# Patient Record
Sex: FEMALE | URBAN - METROPOLITAN AREA
[De-identification: names, ages, dates, MRNs, and addresses within clinical notes are randomized per-mention and may not be internally consistent; named-entity substitution may affect disease eponyms.]

---

## 2018-09-11 NOTE — PATIENT DISCUSSION
Monitor for changes. Advised patient of condition and likely causing some change in vision. Updated GLRx given today. Pt to call our office with decreased vision or increased symptoms.

## 2020-09-16 ENCOUNTER — IMPORTED ENCOUNTER (OUTPATIENT)
Dept: URBAN - METROPOLITAN AREA CLINIC 31 | Facility: CLINIC | Age: 55
End: 2020-09-16

## 2020-09-16 PROBLEM — H43.813: Noted: 2020-09-16

## 2020-09-16 PROCEDURE — 92250 FUNDUS PHOTOGRAPHY W/I&R: CPT

## 2020-09-16 PROCEDURE — 92015 DETERMINE REFRACTIVE STATE: CPT

## 2020-09-16 PROCEDURE — 92004 COMPRE OPH EXAM NEW PT 1/>: CPT

## 2020-09-16 NOTE — PATIENT DISCUSSION
1.  PVD OU:  Patient was cautioned to call our office immediately if they experience a substantial change in their symptoms such as an increase in floaters persistent flashes loss of visual field (may appear as a shadow or a curtain) or decrease in visual acuity as these may indicate a retinal tear or detachment. 2.  [place]--  using Rx readers that are very strong--Got Prog from [place] last yr and cannot use. Has to lift head alot. disc Prog with pt and she will have hers checked with Era Rodriguez states hers are set very low and Bf cut off.   wants stronger readers --expl will make mid harder. Wants new rpog for work.  3.  RTN 1 yr CE    Medical

## 2020-10-26 ENCOUNTER — APPOINTMENT (RX ONLY)
Dept: URBAN - METROPOLITAN AREA CLINIC 125 | Facility: CLINIC | Age: 55
Setting detail: DERMATOLOGY
End: 2020-10-26

## 2020-10-26 DIAGNOSIS — L40.0 PSORIASIS VULGARIS: ICD-10-CM

## 2020-10-26 DIAGNOSIS — Z71.89 OTHER SPECIFIED COUNSELING: ICD-10-CM

## 2020-10-26 DIAGNOSIS — Z85.828 PERSONAL HISTORY OF OTHER MALIGNANT NEOPLASM OF SKIN: ICD-10-CM

## 2020-10-26 DIAGNOSIS — D69.2 OTHER NONTHROMBOCYTOPENIC PURPURA: ICD-10-CM

## 2020-10-26 DIAGNOSIS — L82.1 OTHER SEBORRHEIC KERATOSIS: ICD-10-CM

## 2020-10-26 PROCEDURE — ? COUNSELING

## 2020-10-26 PROCEDURE — 99202 OFFICE O/P NEW SF 15 MIN: CPT

## 2020-10-26 PROCEDURE — ? PRESCRIPTION

## 2020-10-26 RX ORDER — FLUOCINONIDE 0.5 MG/ML
1 SOLUTION TOPICAL BID
Qty: 1 | Refills: 1 | Status: ERX | COMMUNITY
Start: 2020-10-26

## 2020-10-26 RX ADMIN — FLUOCINONIDE 1: 0.5 SOLUTION TOPICAL at 00:00

## 2020-10-26 ASSESSMENT — LOCATION ZONE DERM
LOCATION ZONE: NECK
LOCATION ZONE: NECK
LOCATION ZONE: TRUNK
LOCATION ZONE: ARM

## 2020-10-26 ASSESSMENT — LOCATION DETAILED DESCRIPTION DERM
LOCATION DETAILED: MID POSTERIOR NECK
LOCATION DETAILED: LEFT DISTAL DORSAL FOREARM
LOCATION DETAILED: LEFT PROXIMAL DORSAL FOREARM
LOCATION DETAILED: MID POSTERIOR NECK
LOCATION DETAILED: RIGHT LATERAL SUPERIOR CHEST

## 2020-10-26 ASSESSMENT — LOCATION SIMPLE DESCRIPTION DERM
LOCATION SIMPLE: LEFT FOREARM
LOCATION SIMPLE: CHEST
LOCATION SIMPLE: POSTERIOR NECK
LOCATION SIMPLE: POSTERIOR NECK

## 2020-10-26 ASSESSMENT — BSA PSORIASIS: % BODY COVERED IN PSORIASIS: 1

## 2020-10-26 NOTE — HPI: BODY LOCATION - SCALP
How Severe Is Your Condition?: mild
Additional History: Patient washes her hair with a regular shampoo and has in the past tried Neutrogena T-Gel. Patient states that her son has psoriasis on his scalp.

## 2020-11-10 ENCOUNTER — APPOINTMENT (RX ONLY)
Dept: URBAN - METROPOLITAN AREA CLINIC 125 | Facility: CLINIC | Age: 55
Setting detail: DERMATOLOGY
End: 2020-11-10

## 2020-11-10 DIAGNOSIS — Z71.89 OTHER SPECIFIED COUNSELING: ICD-10-CM

## 2020-11-10 DIAGNOSIS — L81.4 OTHER MELANIN HYPERPIGMENTATION: ICD-10-CM

## 2020-11-10 DIAGNOSIS — L57.0 ACTINIC KERATOSIS: ICD-10-CM

## 2020-11-10 DIAGNOSIS — D485 NEOPLASM OF UNCERTAIN BEHAVIOR OF SKIN: ICD-10-CM

## 2020-11-10 DIAGNOSIS — L82.1 OTHER SEBORRHEIC KERATOSIS: ICD-10-CM

## 2020-11-10 DIAGNOSIS — Z85.828 PERSONAL HISTORY OF OTHER MALIGNANT NEOPLASM OF SKIN: ICD-10-CM

## 2020-11-10 DIAGNOSIS — D18.0 HEMANGIOMA: ICD-10-CM

## 2020-11-10 PROBLEM — D18.01 HEMANGIOMA OF SKIN AND SUBCUTANEOUS TISSUE: Status: ACTIVE | Noted: 2020-11-10

## 2020-11-10 PROBLEM — D23.71 OTHER BENIGN NEOPLASM OF SKIN OF RIGHT LOWER LIMB, INCLUDING HIP: Status: ACTIVE | Noted: 2020-11-10

## 2020-11-10 PROBLEM — D48.5 NEOPLASM OF UNCERTAIN BEHAVIOR OF SKIN: Status: ACTIVE | Noted: 2020-11-10

## 2020-11-10 PROCEDURE — ? BIOPSY BY SHAVE METHOD

## 2020-11-10 PROCEDURE — 11102 TANGNTL BX SKIN SINGLE LES: CPT

## 2020-11-10 PROCEDURE — 17003 DESTRUCT PREMALG LES 2-14: CPT

## 2020-11-10 PROCEDURE — 99214 OFFICE O/P EST MOD 30 MIN: CPT | Mod: 25

## 2020-11-10 PROCEDURE — ? LIQUID NITROGEN

## 2020-11-10 PROCEDURE — 17000 DESTRUCT PREMALG LESION: CPT | Mod: 59

## 2020-11-10 PROCEDURE — ? COUNSELING

## 2020-11-10 ASSESSMENT — LOCATION DETAILED DESCRIPTION DERM
LOCATION DETAILED: LEFT SUPERIOR MEDIAL MIDBACK
LOCATION DETAILED: UPPER STERNUM
LOCATION DETAILED: NASAL DORSUM
LOCATION DETAILED: LEFT LATERAL SUPERIOR CHEST
LOCATION DETAILED: SUPERIOR THORACIC SPINE
LOCATION DETAILED: LEFT MEDIAL FOREHEAD
LOCATION DETAILED: RIGHT RIB CAGE

## 2020-11-10 ASSESSMENT — LOCATION SIMPLE DESCRIPTION DERM
LOCATION SIMPLE: LEFT FOREHEAD
LOCATION SIMPLE: UPPER BACK
LOCATION SIMPLE: ABDOMEN
LOCATION SIMPLE: LEFT LOWER BACK
LOCATION SIMPLE: CHEST
LOCATION SIMPLE: NOSE

## 2020-11-10 ASSESSMENT — LOCATION ZONE DERM
LOCATION ZONE: FACE
LOCATION ZONE: NOSE
LOCATION ZONE: TRUNK

## 2020-11-10 NOTE — PROCEDURE: BIOPSY BY SHAVE METHOD
Detail Level: Detailed
Depth Of Biopsy: dermis
Was A Bandage Applied: Yes
Size Of Lesion In Cm: 0.5
X Size Of Lesion In Cm: 0
Biopsy Type: H and E
Biopsy Method: Dermablade
Anesthesia Type: 1% lidocaine with epinephrine
Hemostasis: Drysol
Wound Care: Petrolatum
Dressing: bandage
Destruction After The Procedure: No
Type Of Destruction Used: Curettage
Curettage Text: The wound bed was treated with curettage after the biopsy was performed.
Cryotherapy Text: The wound bed was treated with cryotherapy after the biopsy was performed.
Electrodesiccation Text: The wound bed was treated with electrodesiccation after the biopsy was performed.
Electrodesiccation And Curettage Text: The wound bed was treated with electrodesiccation and curettage after the biopsy was performed.
Silver Nitrate Text: The wound bed was treated with silver nitrate after the biopsy was performed.
Lab: [facility]
Consent: Written consent was obtained and risks were reviewed including but not limited to scarring, infection, bleeding, scabbing, incomplete removal, nerve damage and allergy to anesthesia.
Post-Care Instructions: I reviewed with the patient in detail post-care instructions. Patient is to keep the biopsy site dry overnight, and then apply bacitracin twice daily until healed. Patient may apply hydrogen peroxide soaks to remove any crusting.
Notification Instructions: Patient will be notified of biopsy results. However, patient instructed to call the office if not contacted within 2 weeks.
Billing Type: [employer]
Information: Selecting Yes will display possible errors in your note based on the variables you have selected. This validation is only offered as a suggestion for you. PLEASE NOTE THAT THE VALIDATION TEXT WILL BE REMOVED WHEN YOU FINALIZE YOUR NOTE. IF YOU WANT TO FAX A PRELIMINARY NOTE YOU WILL NEED TO TOGGLE THIS TO 'NO' IF YOU DO NOT WANT IT IN YOUR FAXED NOTE.

## 2020-11-10 NOTE — PROCEDURE: LIQUID NITROGEN
Consent: The patient's consent was obtained including but not limited to risks of crusting, scabbing, blistering, scarring, darker or lighter pigmentary change, recurrence, incomplete removal and infection.
Render Post-Care Instructions In Note?: no
Duration Of Freeze Thaw-Cycle (Seconds): 0
Detail Level: Detailed
Number Of Freeze-Thaw Cycles: 3 freeze-thaw cycles
Post-Care Instructions: I reviewed with the patient in detail post-care instructions. Patient is to wear sunprotection, and avoid picking at any of the treated lesions. Pt may apply Vaseline to crusted or scabbing areas.

## 2021-01-01 ENCOUNTER — OFFICE VISIT (OUTPATIENT)
Dept: URBAN - METROPOLITAN AREA CLINIC 68 | Facility: CLINIC | Age: 56
End: 2021-01-01

## 2021-04-23 ENCOUNTER — APPOINTMENT (RX ONLY)
Dept: URBAN - METROPOLITAN AREA CLINIC 125 | Facility: CLINIC | Age: 56
Setting detail: DERMATOLOGY
End: 2021-04-23

## 2021-04-23 DIAGNOSIS — Z41.9 ENCOUNTER FOR PROCEDURE FOR PURPOSES OTHER THAN REMEDYING HEALTH STATE, UNSPECIFIED: ICD-10-CM

## 2021-04-23 PROCEDURE — ? COSMETIC CONSULTATION: SKIN CARE PRODUCTS AND SERVICES

## 2021-04-23 PROCEDURE — ? COSMETIC CONSULTATION: FILLERS

## 2021-04-23 PROCEDURE — ? COSMETIC CONSULTATION - ULTHERAPY

## 2021-04-23 PROCEDURE — ? COSMETIC CONSULTATION: LASER RESURFACING

## 2021-04-23 PROCEDURE — ? COSMETIC CONSULTATION: SCLEROTHERAPY

## 2021-04-23 PROCEDURE — ? COSMETIC CONSULTATION: SKIN TIGHTENING

## 2021-04-23 ASSESSMENT — LOCATION SIMPLE DESCRIPTION DERM
LOCATION SIMPLE: RIGHT ANTERIOR NECK
LOCATION SIMPLE: LEFT THIGH
LOCATION SIMPLE: LEFT CHEEK
LOCATION SIMPLE: RIGHT CHEEK

## 2021-04-23 ASSESSMENT — LOCATION ZONE DERM
LOCATION ZONE: FACE
LOCATION ZONE: NECK
LOCATION ZONE: LEG

## 2021-04-23 ASSESSMENT — LOCATION DETAILED DESCRIPTION DERM
LOCATION DETAILED: LEFT CENTRAL MALAR CHEEK
LOCATION DETAILED: RIGHT SUPERIOR ANTERIOR NECK
LOCATION DETAILED: RIGHT INFERIOR MEDIAL MALAR CHEEK
LOCATION DETAILED: LEFT ANTERIOR DISTAL THIGH

## 2021-05-07 ENCOUNTER — APPOINTMENT (RX ONLY)
Dept: URBAN - METROPOLITAN AREA CLINIC 125 | Facility: CLINIC | Age: 56
Setting detail: DERMATOLOGY
End: 2021-05-07

## 2021-05-07 DIAGNOSIS — Z41.9 ENCOUNTER FOR PROCEDURE FOR PURPOSES OTHER THAN REMEDYING HEALTH STATE, UNSPECIFIED: ICD-10-CM

## 2021-05-07 PROCEDURE — ? COSMETIC CONSULTATION: SKIN CARE PRODUCTS AND SERVICES

## 2021-05-07 NOTE — HPI: OTHER
Condition:: Cosmetic Consultation Follow up
Please Describe Your Condition:: is being seen for a chief complaint of fine lines, skin texture changes.  Cosmetic Consultation Follow up

## 2021-05-27 ENCOUNTER — APPOINTMENT (RX ONLY)
Dept: URBAN - METROPOLITAN AREA CLINIC 125 | Facility: CLINIC | Age: 56
Setting detail: DERMATOLOGY
End: 2021-05-27

## 2021-05-27 DIAGNOSIS — Z02.9 ENCOUNTER FOR ADMINISTRATIVE EXAMINATIONS, UNSPECIFIED: ICD-10-CM

## 2021-05-27 PROCEDURE — ? DIAGNOSIS COMMENT

## 2021-05-27 NOTE — PROCEDURE: DIAGNOSIS COMMENT
Render Risk Assessment In Note?: no
Comment: Patient called to reschedule her apts to 6/3/2021
Detail Level: Simple

## 2021-06-03 ENCOUNTER — APPOINTMENT (RX ONLY)
Dept: URBAN - METROPOLITAN AREA CLINIC 125 | Facility: CLINIC | Age: 56
Setting detail: DERMATOLOGY
End: 2021-06-03

## 2021-06-03 DIAGNOSIS — Z41.9 ENCOUNTER FOR PROCEDURE FOR PURPOSES OTHER THAN REMEDYING HEALTH STATE, UNSPECIFIED: ICD-10-CM

## 2021-06-03 PROCEDURE — ? SCLEROTHERAPY (COSMETIC)

## 2021-06-03 ASSESSMENT — LOCATION DETAILED DESCRIPTION DERM
LOCATION DETAILED: LEFT ANTERIOR DISTAL THIGH
LOCATION DETAILED: RIGHT ANTERIOR DISTAL THIGH

## 2021-06-03 ASSESSMENT — LOCATION ZONE DERM: LOCATION ZONE: LEG

## 2021-06-03 ASSESSMENT — LOCATION SIMPLE DESCRIPTION DERM
LOCATION SIMPLE: LEFT THIGH
LOCATION SIMPLE: RIGHT THIGH

## 2021-06-03 NOTE — PROCEDURE: SCLEROTHERAPY (COSMETIC)
Detail Level: Detailed
Sclerosant Volume (Cc): 2
Consent was obtained with risks, benefits, and alternatives discussed for the above procedures. Photographs were taken.
Expiration Date A (Month Year): 06/2022
Price (Use Numbers Only, No Special Characters Or $): 400.00
Lot # [id number]: [id number]
Disposition: Compression stockings and short stretch elastic bandages were placed postoperatively.
Post-Care Instructions: Compression for 3 weeks is critical to optimize your recovery and results. Compliance with compression helps to prevent blood clots and minimizes pain, swelling, bruising, skin discoloration (staining) and the recurrence of vessels. \nMaterials to gather for your wound care (all available over the counter)      \nCompression stockings x 2 pairs, 4 x 4 gauze, Comprilan wrap: 8 cm and 10 cm width wrap, Medical adhesive tape       \nCompression and Wound care;  Leg compression for 3 weeks is mathis to your success and safety. Compression at night is only needed the first day. After that, compression is needed only during waking hours. However, if your leg feels better with compression at night, then you may continue compression at night as tolerated. \nAfter the sclerotherapy procedure, 2 layers compression will be placed. \n1. On the skin, folded or flat gauze will cover the treated areas. \n2. A compression wrap (Comprilan) will be wrapped around your leg over the gauze. Once the compression wrap is in place, a compression stocking will be worn. This two layer  compression (wrap plus stocking) should be worn for the first 24 hours if tolerated. \n3. After 24 hours, remove all compressions and dressings and just wear the compression stockings only during waking hours. \nYou will need to wear compression stockings for three weeks after your procedure, unless your physician instructs you otherwise. \nActivity       \n - It is important NOT to be sitting or lying down for several hours after surgery. You may begin walking immediately after surgery. This is good for you, but take it easy. \n - For 2 days after treatment: Avoid aerobic exercise, weight training, and all other types of exertion that increase your breathing and pulse rates. \n - Do not get a tan for one month after sclerotherapy. Tanning increases your risk of skin discoloration. \nBathing       \nAfter 24 hours, you may shower or bathe in tepid water, but keep the compression stocking on. Avoid immersion in hot tubs. \nDiscomfort . For pain or discomfort:       \n - You may take acetaminophen (TylenolTM, Extra-Strength TylenolTM or DatrilTM) as directed. \n - Do not use aspirin, products containing aspirin, or ibuprofen (for example AdvilTM or MotrinTM) for five days after your surgery, unless approved by your physician. \n - Dietary restrictions Do not drink alcoholic beverages for two days after your sclerotherapy procedure. \nPossible side effects following sclerotherapy  After sclerotherapy, mild swelling is expected. The injection sites may also become bruised or gray. You may also experience one or more of the following side effects, which almost always go away within one to four months:       \n - Darkening of the injected veins       \n - Brownish staining of the skin       \n - Small clotted vessels under the surface of the skin that you can feel      \n - Bruising of the injection sites       \nWhat to do about bruising  This will resolve within 2-3 weeks. If you wish the bruising to disappear sooner, then applying Arnicare cream (over the counter, health food stores) will help. \nWhat to do if you feel small clotted vessels under the surface of the skin. \n - Call us for a follow up appointment. These small clots can be drained through a small nick. \n - Draining these small clots will help you heal faster and with less discoloration.       \nWhen to contact your physician       \nContact your physician if you experience any of the following:       \n - Treated areas become increasingly sore, tender, red or warm       \n - Acetaminophen does not relieve your discomfort       \n - Injection sites turn black or the skin around the site breaks down       \n - Ulceration of the injection sites       \n - You develop blisters from the tape       \n - You develop significant swelling or pain in the leg       \n - Darkening of large areas of the skin or foot
Sclerosant Volume (Cc): 10

## 2021-07-01 ENCOUNTER — APPOINTMENT (RX ONLY)
Dept: URBAN - METROPOLITAN AREA CLINIC 125 | Facility: CLINIC | Age: 56
Setting detail: DERMATOLOGY
End: 2021-07-01

## 2021-07-01 DIAGNOSIS — I83.9 ASYMPTOMATIC VARICOSE VEINS OF LOWER EXTREMITIES: ICD-10-CM

## 2021-07-01 PROBLEM — I83.93 ASYMPTOMATIC VARICOSE VEINS OF BILATERAL LOWER EXTREMITIES: Status: ACTIVE | Noted: 2021-07-01

## 2021-07-01 PROCEDURE — ? SCLEROTHERAPY

## 2021-07-01 ASSESSMENT — LOCATION DETAILED DESCRIPTION DERM
LOCATION DETAILED: LEFT PROXIMAL PRETIBIAL REGION
LOCATION DETAILED: LEFT MEDIAL KNEE
LOCATION DETAILED: RIGHT PROXIMAL PRETIBIAL REGION
LOCATION DETAILED: LEFT ANTERIOR DISTAL THIGH
LOCATION DETAILED: RIGHT ANTERIOR DISTAL THIGH

## 2021-07-01 ASSESSMENT — LOCATION SIMPLE DESCRIPTION DERM
LOCATION SIMPLE: RIGHT PRETIBIAL REGION
LOCATION SIMPLE: LEFT THIGH
LOCATION SIMPLE: LEFT PRETIBIAL REGION
LOCATION SIMPLE: RIGHT THIGH
LOCATION SIMPLE: LEFT KNEE

## 2021-07-01 ASSESSMENT — LOCATION ZONE DERM: LOCATION ZONE: LEG

## 2021-07-01 NOTE — PROCEDURE: SCLEROTHERAPY
Sclerosant (B) %: 0.50
Disposition: Compression stockings and short stretch elastic bandages were placed postoperatively.
Lot # [id number]: [id number]
Sclerosant Volume (Cc): 2
Consent was obtained with risks, benefits, and alternatives discussed for the above procedures. Photographs were taken.
Lot # [id number]: [id number]
Expiration Date A (Month Year): 06/22
Post-Care Instructions: Compression for 3 weeks is critical to optimize your recovery and results. Compliance with compression helps to prevent blood clots and minimizes pain, swelling, bruising, skin discoloration (staining) and the recurrence of vessels. \nMaterials to gather for your wound care (all available over the counter)      \nCompression stockings x 2 pairs, 4 x 4 gauze, Comprilan wrap: 8 cm and 10 cm width wrap, Medical adhesive tape       \nCompression and Wound care;  Leg compression for 3 weeks is mathis to your success and safety. Compression at night is only needed the first day. After that, compression is needed only during waking hours. However, if your leg feels better with compression at night, then you may continue compression at night as tolerated. \nAfter the sclerotherapy procedure, 2 layers compression will be placed. \n1. On the skin, folded or flat gauze will cover the treated areas. \n2. A compression wrap (Comprilan) will be wrapped around your leg over the gauze. Once the compression wrap is in place, a compression stocking will be worn. This two layer  compression (wrap plus stocking) should be worn for the first 24 hours if tolerated. \n3. After 24 hours, remove all compressions and dressings and just wear the compression stockings only during waking hours. \nYou will need to wear compression stockings for three weeks after your procedure, unless your physician instructs you otherwise. \nActivity       \n - It is important NOT to be sitting or lying down for several hours after surgery. You may begin walking immediately after surgery. This is good for you, but take it easy. \n - For 2 days after treatment: Avoid aerobic exercise, weight training, and all other types of exertion that increase your breathing and pulse rates. \n - Do not get a tan for one month after sclerotherapy. Tanning increases your risk of skin discoloration. \nBathing       \nAfter 24 hours, you may shower or bathe in tepid water, but keep the compression stocking on. Avoid immersion in hot tubs. \nDiscomfort . For pain or discomfort:       \n - You may take acetaminophen (TylenolTM, Extra-Strength TylenolTM or DatrilTM) as directed. \n - Do not use aspirin, products containing aspirin, or ibuprofen (for example AdvilTM or MotrinTM) for five days after your surgery, unless approved by your physician. \n - Dietary restrictions Do not drink alcoholic beverages for two days after your sclerotherapy procedure. \nPossible side effects following sclerotherapy  After sclerotherapy, mild swelling is expected. The injection sites may also become bruised or gray. You may also experience one or more of the following side effects, which almost always go away within one to four months:       \n - Darkening of the injected veins       \n - Brownish staining of the skin       \n - Small clotted vessels under the surface of the skin that you can feel      \n - Bruising of the injection sites       \nWhat to do about bruising  This will resolve within 2-3 weeks. If you wish the bruising to disappear sooner, then applying Arnicare cream (over the counter, health food stores) will help. \nWhat to do if you feel small clotted vessels under the surface of the skin. \n - Call us for a follow up appointment. These small clots can be drained through a small nick. \n - Draining these small clots will help you heal faster and with less discoloration.       \nWhen to contact your physician       \nContact your physician if you experience any of the following:       \n - Treated areas become increasingly sore, tender, red or warm       \n - Acetaminophen does not relieve your discomfort       \n - Injection sites turn black or the skin around the site breaks down       \n - Ulceration of the injection sites       \n - You develop blisters from the tape       \n - You develop significant swelling or pain in the leg       \n - Darkening of large areas of the skin or foot
Sclerosant Volume (Cc): 10
Render Post Care In Note: No
Detail Level: Detailed
Number Of Injections (Will Not Render If 0): 0

## 2021-08-02 ENCOUNTER — OFFICE VISIT (OUTPATIENT)
Dept: URBAN - METROPOLITAN AREA CLINIC 68 | Facility: CLINIC | Age: 56
End: 2021-08-02

## 2021-08-10 ENCOUNTER — OFFICE VISIT (OUTPATIENT)
Dept: URBAN - METROPOLITAN AREA SURGERY CENTER 12 | Facility: SURGERY CENTER | Age: 56
End: 2021-08-10

## 2021-08-12 ENCOUNTER — TELEPHONE ENCOUNTER (OUTPATIENT)
Dept: URBAN - METROPOLITAN AREA CLINIC 68 | Facility: CLINIC | Age: 56
End: 2021-08-12

## 2021-08-12 ENCOUNTER — LAB OUTSIDE AN ENCOUNTER (OUTPATIENT)
Dept: URBAN - METROPOLITAN AREA CLINIC 68 | Facility: CLINIC | Age: 56
End: 2021-08-12

## 2021-08-12 LAB
01: (no result)
01: (no result)

## 2021-09-29 ENCOUNTER — IMPORTED ENCOUNTER (OUTPATIENT)
Dept: URBAN - METROPOLITAN AREA CLINIC 31 | Facility: CLINIC | Age: 56
End: 2021-09-29

## 2021-09-29 PROBLEM — H43.813: Noted: 2021-09-29

## 2021-09-29 PROBLEM — H04.123: Noted: 2021-09-29

## 2021-09-29 PROCEDURE — 92250 FUNDUS PHOTOGRAPHY W/I&R: CPT

## 2021-09-29 PROCEDURE — 92015 DETERMINE REFRACTIVE STATE: CPT

## 2021-09-29 PROCEDURE — 92014 COMPRE OPH EXAM EST PT 1/>: CPT

## 2021-09-29 NOTE — PATIENT DISCUSSION
1.  Dry Eye OU:  Start Xiidra BID. Discussed that [place] helps to increase the production of the patient's own tears and therefore can take 3-4 months for an improvement in symptoms. Use AT frequent. 2. PVD OU:  Patient was cautioned to call our office immediately if they experience a substantial change in their symptoms such as an increase in floaters persistent flashes loss of visual field (may appear as a shadow or a curtain) or decrease in visual acuity as these may indicate a retinal tear or detachment. 3.  [place]-- Geting new rx--lost her prog-- using Rx readers that are very strong--Got Prog from [place] last yr and cannot use. Has to lift head alot. disc Prog with pt and she will have hers checked with Anna Fishman states hers are set very low and Bf cut off.   wants stronger readers --expl will make mid harder. Wants new rpog for work. 4.  RTN 3 mths OC--FU on Xiidra5.   RTN 1 yr [facility]

## 2021-09-29 NOTE — PATIENT DISCUSSION
1.  PVD OU:  Patient was cautioned to call our office immediately if they experience a substantial change in their symptoms such as an increase in floaters persistent flashes loss of visual field (may appear as a shadow or a curtain) or decrease in visual acuity as these may indicate a retinal tear or detachment. 2.  [place]--  using Rx readers that are very strong--Got Prog from [place] last yr and cannot use. Has to lift head alot. disc Prog with pt and she will have hers checked with Everet Merlin states hers are set very low and Bf cut off.   wants stronger readers --expl will make mid harder. Wants new rpog for work.  3.  RTN 1 yr CE    Medical

## 2021-11-29 ENCOUNTER — APPOINTMENT (RX ONLY)
Dept: URBAN - METROPOLITAN AREA CLINIC 126 | Facility: CLINIC | Age: 56
Setting detail: DERMATOLOGY
End: 2021-11-29

## 2021-11-29 DIAGNOSIS — Z41.9 ENCOUNTER FOR PROCEDURE FOR PURPOSES OTHER THAN REMEDYING HEALTH STATE, UNSPECIFIED: ICD-10-CM

## 2021-11-29 PROCEDURE — ? JUVEDERM ULTRA XC INJECTION

## 2021-11-29 PROCEDURE — ? RESTYLANE LYFT INJECTION

## 2021-11-29 PROCEDURE — ? RESTYLANE KYSSE INJECTION

## 2021-11-29 ASSESSMENT — LOCATION DETAILED DESCRIPTION DERM
LOCATION DETAILED: RIGHT SUPERIOR VERMILION LIP
LOCATION DETAILED: RIGHT SUPERIOR VERMILION BORDER
LOCATION DETAILED: RIGHT INFERIOR VERMILION LIP
LOCATION DETAILED: LEFT UPPER CUTANEOUS LIP
LOCATION DETAILED: LEFT CENTRAL BUCCAL CHEEK
LOCATION DETAILED: LEFT INFERIOR VERMILION LIP
LOCATION DETAILED: RIGHT LATERAL BUCCAL CHEEK
LOCATION DETAILED: LEFT INFERIOR CENTRAL BUCCAL CHEEK
LOCATION DETAILED: LEFT SUPERIOR VERMILION LIP
LOCATION DETAILED: RIGHT INFERIOR VERMILION BORDER
LOCATION DETAILED: LEFT INFERIOR MEDIAL MALAR CHEEK
LOCATION DETAILED: RIGHT INFERIOR MEDIAL MALAR CHEEK
LOCATION DETAILED: LEFT LOWER CUTANEOUS LIP
LOCATION DETAILED: RIGHT MEDIAL BUCCAL CHEEK
LOCATION DETAILED: PHILTRUM
LOCATION DETAILED: RIGHT UPPER CUTANEOUS LIP

## 2021-11-29 ASSESSMENT — LOCATION SIMPLE DESCRIPTION DERM
LOCATION SIMPLE: RIGHT UPPER LIP
LOCATION SIMPLE: RIGHT LOWER LIP
LOCATION SIMPLE: UPPER LIP
LOCATION SIMPLE: LEFT CHEEK
LOCATION SIMPLE: RIGHT CHEEK
LOCATION SIMPLE: RIGHT LIP
LOCATION SIMPLE: LEFT LIP

## 2021-11-29 ASSESSMENT — LOCATION ZONE DERM
LOCATION ZONE: FACE
LOCATION ZONE: LIP

## 2021-11-29 NOTE — PROCEDURE: RESTYLANE KYSSE INJECTION
Lot #: [id number]
Marionette Lines Filler  Volume In Cc: 0.5
Include Cannula Information In Note?: No
Nasolabial Folds Filler Volume In Cc: 0
Detail Level: Detailed
Map Statement: See [address] for Complete Details
Consent: Written consent obtained. Risks include but not limited to bruising, beading, irregular texture, ulceration, infection, allergic reaction, scar formation, incomplete augmentation, temporary nature, procedural pain.
Expiration Date (Month Year): 05/31/2023
Filler: Restylane Kysse
Post-Care Instructions: Patient instructed to apply ice to reduce swelling.
Anesthesia Type: 1% lidocaine with epinephrine
Additional Area 1 Location: upper and lower cutaneous lip
Additional Anesthesia Volume In Cc: 6
Number Of Syringes (Required For Inventory): 1
Procedural Text: The filler was administered to the treatment areas noted above.

## 2021-11-29 NOTE — PROCEDURE: JUVEDERM ULTRA XC INJECTION
Include Cannula Information In Note?: No
Cheeks Filler Volume In Cc: 0
Post-Care Instructions: Patient instructed to apply ice to reduce swelling.
Anesthesia Volume In Cc: 0.5
Detail Level: Detailed
Expiration Date (Month Year): 07/07/2022
Map Statment: See [address] for Complete Details
Filler: Juvederm Ultra XC
Anesthesia Type: 1% lidocaine with epinephrine
Consent: Written consent obtained. Risks include but not limited to bruising, beading, irregular texture, ulceration, infection, allergic reaction, scar formation, incomplete augmentation, temporary nature, procedural pain.
Additional Anesthesia Volume In Cc: 6
Procedural Text: The filler was administered to the treatment areas noted above.
Additional Area 1 Location: oral commissures
Vermilion Lips Filler Volume In Cc: 0.8
Lot #: [id number]
Additional Area 1 Volume In Cc: 0.2
Number Of Syringes (Required For Inventory): 1

## 2021-11-29 NOTE — PROCEDURE: RESTYLANE LYFT INJECTION
Additional Anesthesia Volume In Cc: 6
Use Map Statement For Sites (Optional): No
Procedural Text: The filler was administered to the treatment areas noted above.
[place] Filler  Volume In Cc: 0
Lot #: [id number]
Number Of Syringes (Required For Inventory): 1
Consent: Written consent obtained. Risks include but not limited to bruising, beading, irregular texture, ulceration, infection, allergic reaction, scar formation, incomplete augmentation, temporary nature, procedural pain.
Marionette Lines Filler  Volume In Cc: 0.5
Map Statement: See [address] for Complete Details
Detail Level: Detailed
Post-Care Instructions: Patient instructed to apply ice to reduce swelling.
Expiration Date (Month Year): 03/31/2024
Filler: Kane Jameson
Anesthesia Type: 1% lidocaine with epinephrine

## 2021-12-13 ENCOUNTER — APPOINTMENT (RX ONLY)
Dept: URBAN - METROPOLITAN AREA CLINIC 126 | Facility: CLINIC | Age: 56
Setting detail: DERMATOLOGY
End: 2021-12-13

## 2021-12-13 DIAGNOSIS — Z41.9 ENCOUNTER FOR PROCEDURE FOR PURPOSES OTHER THAN REMEDYING HEALTH STATE, UNSPECIFIED: ICD-10-CM

## 2021-12-13 PROCEDURE — ? COSMETIC FOLLOW-UP

## 2021-12-13 ASSESSMENT — LOCATION DETAILED DESCRIPTION DERM
LOCATION DETAILED: RIGHT MEDIAL BUCCAL CHEEK
LOCATION DETAILED: LEFT MEDIAL BUCCAL CHEEK

## 2021-12-13 ASSESSMENT — LOCATION SIMPLE DESCRIPTION DERM
LOCATION SIMPLE: RIGHT CHEEK
LOCATION SIMPLE: LEFT CHEEK

## 2021-12-13 ASSESSMENT — LOCATION ZONE DERM: LOCATION ZONE: FACE

## 2021-12-13 NOTE — PROCEDURE: COSMETIC FOLLOW-UP
Side Effects Or Complications: Extensive bruising
Detail Level: Zone
Side Effects Override (Free Text): left marrionette with bruising
Global Improvement: Very Good

## 2021-12-15 ENCOUNTER — IMPORTED ENCOUNTER (OUTPATIENT)
Dept: URBAN - METROPOLITAN AREA CLINIC 31 | Facility: CLINIC | Age: 56
End: 2021-12-15

## 2021-12-15 PROBLEM — H04.123: Noted: 2021-12-15

## 2021-12-15 PROBLEM — H43.813: Noted: 2021-12-15

## 2021-12-15 PROCEDURE — 99213 OFFICE O/P EST LOW 20 MIN: CPT

## 2021-12-15 NOTE — PATIENT DISCUSSION
1.  Dry Eye OU:  50% better per pt--Cont Xiidra BID. Discussed that [place] helps to increase the production of the patient's own tears and therefore can take 3-4 months for an improvement in symptoms. Use AT frequent. 2. PVD OU:  Patient was cautioned to call our office immediately if they experience a substantial change in their symptoms such as an increase in floaters persistent flashes loss of visual field (may appear as a shadow or a curtain) or decrease in visual acuity as these may indicate a retinal tear or detachment. 3.  [facility]-- Geting new rx--lost her prog-- using Rx readers that are very strong--Got Prog from [facility] last yr and cannot use. Has to lift head alot. disc Prog with pt and she will have hers checked with Elliot Louis states hers are set very low and Bf cut off.   wants stronger readers --expl will make mid harder. Wants new rpog for work.  4.  RTN 9/22  [facility]

## 2022-04-02 ASSESSMENT — VISUAL ACUITY
OD_CC: 20/20
OD_SC: 20/200
OS_SC: 20/200
OD_CC: 20/30
OS_CC: 20/25+3
OS_CC: 20/30

## 2022-04-02 ASSESSMENT — TONOMETRY
OS_IOP_MMHG: 16
OD_IOP_MMHG: 15
OS_IOP_MMHG: 16
OD_IOP_MMHG: 15

## 2022-06-02 ENCOUNTER — APPOINTMENT (RX ONLY)
Dept: URBAN - METROPOLITAN AREA CLINIC 126 | Facility: CLINIC | Age: 57
Setting detail: DERMATOLOGY
End: 2022-06-02

## 2022-06-02 DIAGNOSIS — Z41.9 ENCOUNTER FOR PROCEDURE FOR PURPOSES OTHER THAN REMEDYING HEALTH STATE, UNSPECIFIED: ICD-10-CM

## 2022-06-02 PROCEDURE — ? COOLSCULPTING

## 2022-06-04 ENCOUNTER — TELEPHONE ENCOUNTER (OUTPATIENT)
Dept: URBAN - METROPOLITAN AREA CLINIC 68 | Facility: CLINIC | Age: 57
End: 2022-06-04

## 2022-06-04 RX ORDER — SODIUM SULFATE, POTASSIUM SULFATE, MAGNESIUM SULFATE 17.5; 3.13; 1.6 G/ML; G/ML; G/ML
SOLUTION, CONCENTRATE ORAL AS DIRECTED
Qty: 1 | Refills: 0 | OUTPATIENT
Start: 2018-01-24 | End: 2018-01-25

## 2022-06-04 RX ORDER — FAMOTIDINE 40 MG/1
TABLET, FILM COATED ORAL
Qty: 180 | Refills: 180 | OUTPATIENT
Start: 2020-04-19 | End: 2020-04-22

## 2022-06-04 RX ORDER — ROSUVASTATIN CALCIUM 10 MG/1
ROSUVASTATIN CALCIUM( 10MG ORAL  DAILY ) INACTIVE -HX ENTRY TABLET, FILM COATED ORAL DAILY
OUTPATIENT
Start: 2021-08-02

## 2022-06-05 ENCOUNTER — TELEPHONE ENCOUNTER (OUTPATIENT)
Dept: URBAN - METROPOLITAN AREA CLINIC 68 | Facility: CLINIC | Age: 57
End: 2022-06-05

## 2022-06-05 RX ORDER — LOSARTAN POTASSIUM 25 MG/1
LOSARTAN POTASSIUM( 25MG ORAL 2 DAILY ) ACTIVE -HX ENTRY TABLET ORAL DAILY
Status: ACTIVE | COMMUNITY
Start: 2021-08-02

## 2022-06-05 RX ORDER — OMEPRAZOLE 20 MG/1
CAPSULE, DELAYED RELEASE ORAL
Qty: 90 | Refills: 90 | Status: ACTIVE | COMMUNITY
Start: 2021-06-13

## 2022-06-05 RX ORDER — ALPRAZOLAM 0.25 MG
XANAX( 0.25MG ORAL  AS NEEDED ) ACTIVE -HX ENTRY TABLET ORAL AS NEEDED
Status: ACTIVE | COMMUNITY
Start: 2021-08-02

## 2022-06-25 ENCOUNTER — TELEPHONE ENCOUNTER (OUTPATIENT)
Age: 57
End: 2022-06-25

## 2022-06-25 RX ORDER — ROSUVASTATIN CALCIUM 10 MG/1
ROSUVASTATIN CALCIUM( 10MG ORAL  DAILY ) INACTIVE -HX ENTRY TABLET, FILM COATED ORAL DAILY
OUTPATIENT
Start: 2021-08-02

## 2022-06-25 RX ORDER — SODIUM SULFATE, POTASSIUM SULFATE, MAGNESIUM SULFATE 17.5; 3.13; 1.6 G/ML; G/ML; G/ML
SOLUTION, CONCENTRATE ORAL AS DIRECTED
Qty: 1 | Refills: 0 | OUTPATIENT
Start: 2018-01-24 | End: 2018-01-25

## 2022-06-25 RX ORDER — FAMOTIDINE 40 MG/1
TABLET, FILM COATED ORAL
Qty: 180 | Refills: 180 | OUTPATIENT
Start: 2020-04-19 | End: 2020-04-22

## 2022-06-26 ENCOUNTER — TELEPHONE ENCOUNTER (OUTPATIENT)
Age: 57
End: 2022-06-26

## 2022-06-26 RX ORDER — ALPRAZOLAM 0.25 MG
XANAX( 0.25MG ORAL  AS NEEDED ) ACTIVE -HX ENTRY TABLET ORAL AS NEEDED
Status: ACTIVE | COMMUNITY
Start: 2021-08-02

## 2022-06-26 RX ORDER — LOSARTAN POTASSIUM 25 MG/1
LOSARTAN POTASSIUM( 25MG ORAL 2 DAILY ) ACTIVE -HX ENTRY TABLET, FILM COATED ORAL DAILY
Status: ACTIVE | COMMUNITY
Start: 2021-08-02

## 2022-06-26 RX ORDER — OMEPRAZOLE 20 MG/1
CAPSULE, DELAYED RELEASE ORAL
Qty: 90 | Refills: 90 | Status: ACTIVE | COMMUNITY
Start: 2021-06-13

## 2022-08-29 ENCOUNTER — APPOINTMENT (RX ONLY)
Dept: URBAN - METROPOLITAN AREA CLINIC 126 | Facility: CLINIC | Age: 57
Setting detail: DERMATOLOGY
End: 2022-08-29

## 2022-08-29 DIAGNOSIS — L82.0 INFLAMED SEBORRHEIC KERATOSIS: ICD-10-CM

## 2022-08-29 DIAGNOSIS — Z85.828 PERSONAL HISTORY OF OTHER MALIGNANT NEOPLASM OF SKIN: ICD-10-CM

## 2022-08-29 DIAGNOSIS — Z71.89 OTHER SPECIFIED COUNSELING: ICD-10-CM

## 2022-08-29 PROCEDURE — ? COUNSELING

## 2022-08-29 PROCEDURE — 17110 DESTRUCTION B9 LES UP TO 14: CPT

## 2022-08-29 PROCEDURE — ? DIAGNOSIS COMMENT

## 2022-08-29 PROCEDURE — ? SUNSCREEN RECOMMENDATIONS

## 2022-08-29 PROCEDURE — ? LIQUID NITROGEN

## 2022-08-29 PROCEDURE — 99212 OFFICE O/P EST SF 10 MIN: CPT | Mod: 25

## 2022-08-29 ASSESSMENT — LOCATION ZONE DERM: LOCATION ZONE: TRUNK

## 2022-08-29 ASSESSMENT — LOCATION DETAILED DESCRIPTION DERM: LOCATION DETAILED: LEFT MEDIAL SUPERIOR CHEST

## 2022-08-29 ASSESSMENT — LOCATION SIMPLE DESCRIPTION DERM: LOCATION SIMPLE: CHEST

## 2022-08-29 NOTE — PROCEDURE: LIQUID NITROGEN
Detail Level: Detailed
Consent: The patient's consent was obtained including but not limited to risks of crusting, scabbing, blistering, scarring, darker or lighter pigmentary change, recurrence, incomplete removal and infection.
Medical Necessity Information: It is in your best interest to select a reason for this procedure from the list below. All of these items fulfill various CMS LCD requirements except the new and changing color options.
Add 52 Modifier (Optional): no
Spray Paint Text: The liquid nitrogen was applied to the skin utilizing a spray paint frosting technique.
Medical Necessity Clause: This procedure was medically necessary because the lesions that were treated were:
Show Applicator Variable?: Yes
Post-Care Instructions: I reviewed with the patient in detail post-care instructions. Patient is to wear sunprotection, and avoid picking at any of the treated lesions. Pt may apply Vaseline to crusted or scabbing areas.

## 2022-08-29 NOTE — PROCEDURE: SUNSCREEN RECOMMENDATIONS
Detail Level: Simple
Products Recommended: Drinda Epley MD
General Sunscreen Counseling: I recommended a broad spectrum sunscreen with a SPF of 30 or higher. I explained that SPF 30 sunscreens block approximately 97 percent of the sun's harmful rays. Sunscreens should be applied at least 15 minutes prior to expected sun exposure and then every 2 hours after that as long as sun exposure continues. If swimming or exercising sunscreen should be reapplied every 45 minutes to an hour after getting wet or sweating. One ounce, or the equivalent of a shot glass full of sunscreen, is adequate to protect the skin not covered by a bathing suit. I also recommended a lip balm with a sunscreen as well. Sun protective clothing can be used in lieu of sunscreen but must be worn the entire time you are exposed to the sun's rays.

## 2022-08-30 ENCOUNTER — APPOINTMENT (RX ONLY)
Dept: URBAN - METROPOLITAN AREA CLINIC 126 | Facility: CLINIC | Age: 57
Setting detail: DERMATOLOGY
End: 2022-08-30

## 2022-08-30 DIAGNOSIS — Z41.9 ENCOUNTER FOR PROCEDURE FOR PURPOSES OTHER THAN REMEDYING HEALTH STATE, UNSPECIFIED: ICD-10-CM

## 2022-08-30 PROCEDURE — ? RESTYLANE KYSSE INJECTION

## 2022-08-30 NOTE — PROCEDURE: RESTYLANE KYSSE INJECTION
Marionette Lines Filler  Volume In Cc: 0.2
Brows Filler  Volume In Cc: 0
Additional Area 2 Location: oral commissures
Consent: Written consent obtained. Risks include but not limited to bruising, beading, irregular texture, ulceration, infection, allergic reaction, scar formation, incomplete augmentation, temporary nature, procedural pain.
Anesthesia Volume In Cc: 0.5
Procedural Text: The filler was administered to the treatment areas noted above.
Map Statement: See [address] for Complete Details
Vermilion Lips Filler Volume In Cc: 0.4
Post-Care Instructions: Patient instructed to apply ice to reduce swelling.
Filler: Restylane Kysse
Include Cannula Information In Note?: No
Detail Level: Detailed
Expiration Date (Month Year): 8/2023
Anesthesia Type: 1% lidocaine with epinephrine
Additional Area 1 Location: upper and lower lip lines
Additional Anesthesia Volume In Cc: 6
Lot #: [id number]
Show Inventory Tab: Show
Number Of Syringes (Required For Inventory): 1

## 2022-10-11 ENCOUNTER — APPOINTMENT (RX ONLY)
Dept: URBAN - METROPOLITAN AREA CLINIC 126 | Facility: CLINIC | Age: 57
Setting detail: DERMATOLOGY
End: 2022-10-11

## 2022-10-11 DIAGNOSIS — L81.5 LEUKODERMA, NOT ELSEWHERE CLASSIFIED: ICD-10-CM

## 2022-10-11 DIAGNOSIS — L81.4 OTHER MELANIN HYPERPIGMENTATION: ICD-10-CM

## 2022-10-11 DIAGNOSIS — Z85.828 PERSONAL HISTORY OF OTHER MALIGNANT NEOPLASM OF SKIN: ICD-10-CM

## 2022-10-11 DIAGNOSIS — L57.0 ACTINIC KERATOSIS: ICD-10-CM | Status: INADEQUATELY CONTROLLED

## 2022-10-11 DIAGNOSIS — Z71.89 OTHER SPECIFIED COUNSELING: ICD-10-CM

## 2022-10-11 DIAGNOSIS — D18.0 HEMANGIOMA: ICD-10-CM

## 2022-10-11 PROBLEM — D18.01 HEMANGIOMA OF SKIN AND SUBCUTANEOUS TISSUE: Status: ACTIVE | Noted: 2022-10-11

## 2022-10-11 PROCEDURE — ? COUNSELING

## 2022-10-11 PROCEDURE — 99214 OFFICE O/P EST MOD 30 MIN: CPT

## 2022-10-11 PROCEDURE — ? PRESCRIPTION MEDICATION MANAGEMENT

## 2022-10-11 PROCEDURE — ? PRESCRIPTION

## 2022-10-11 PROCEDURE — ? DIAGNOSIS COMMENT

## 2022-10-11 RX ORDER — PHARMACY COMPOUNDING ACCESSORY
EACH MISCELLANEOUS
Qty: 30 | Refills: 0 | Status: ERX | COMMUNITY
Start: 2022-10-11

## 2022-10-11 RX ADMIN — Medication: at 00:00

## 2022-10-11 ASSESSMENT — LOCATION DETAILED DESCRIPTION DERM
LOCATION DETAILED: LEFT INFERIOR MEDIAL UPPER BACK
LOCATION DETAILED: LEFT MEDIAL SUPERIOR CHEST
LOCATION DETAILED: LEFT CENTRAL MALAR CHEEK
LOCATION DETAILED: RIGHT UPPER CUTANEOUS LIP
LOCATION DETAILED: LEFT MEDIAL UPPER BACK
LOCATION DETAILED: LEFT DISTAL PRETIBIAL REGION
LOCATION DETAILED: RIGHT PROXIMAL PRETIBIAL REGION
LOCATION DETAILED: RIGHT DISTAL PRETIBIAL REGION
LOCATION DETAILED: SUPERIOR LUMBAR SPINE
LOCATION DETAILED: LEFT PROXIMAL PRETIBIAL REGION

## 2022-10-11 ASSESSMENT — LOCATION SIMPLE DESCRIPTION DERM
LOCATION SIMPLE: RIGHT PRETIBIAL REGION
LOCATION SIMPLE: LOWER BACK
LOCATION SIMPLE: RIGHT LIP
LOCATION SIMPLE: LEFT PRETIBIAL REGION
LOCATION SIMPLE: CHEST
LOCATION SIMPLE: LEFT UPPER BACK
LOCATION SIMPLE: LEFT CHEEK

## 2022-10-11 ASSESSMENT — LOCATION ZONE DERM
LOCATION ZONE: TRUNK
LOCATION ZONE: LIP
LOCATION ZONE: FACE
LOCATION ZONE: LEG

## 2022-10-11 NOTE — PROCEDURE: MIPS QUALITY
[id number]  
Quality 130: Documentation Of Current Medications In The Medical Record: Current Medications Documented
Detail Level: Detailed

## 2022-10-11 NOTE — PROCEDURE: DIAGNOSIS COMMENT
Render Risk Assessment In Note?: yes
Comment: Hx of cold sores.  States she has valtrex at home , will begin 500mg twice daily x 5 days when starting cream
Detail Level: Detailed

## 2022-10-11 NOTE — PROCEDURE: PRESCRIPTION MEDICATION MANAGEMENT
Plan: 5FU cream twice daily upper lip and spots on both cheeks x 4 days
Render In Strict Bullet Format?: No
Detail Level: Detailed

## 2022-10-14 ENCOUNTER — COMPREHENSIVE EXAM (OUTPATIENT)
Dept: URBAN - METROPOLITAN AREA CLINIC 34 | Facility: CLINIC | Age: 57
End: 2022-10-14

## 2022-10-14 DIAGNOSIS — H02.831: ICD-10-CM

## 2022-10-14 DIAGNOSIS — H02.834: ICD-10-CM

## 2022-10-14 DIAGNOSIS — H52.4: ICD-10-CM

## 2022-10-14 DIAGNOSIS — H43.813: ICD-10-CM

## 2022-10-14 DIAGNOSIS — H04.123: ICD-10-CM

## 2022-10-14 PROCEDURE — 92014 COMPRE OPH EXAM EST PT 1/>: CPT

## 2022-10-14 PROCEDURE — 92015 DETERMINE REFRACTIVE STATE: CPT

## 2022-10-14 ASSESSMENT — TONOMETRY
OD_IOP_MMHG: 16
OS_IOP_MMHG: 16

## 2022-10-14 NOTE — PATIENT DISCUSSION
Presabdullahiope-- Geting new rx--lost her prog-- using Rx readers that are very strong--Got Prog from [place] last yr and cannot use. Has to lift head alot. disc Prog with pt and she will have hers checked with Everet Merlin states hers are set very low and Bf cut off. wants stronger readers --expl will make mid harder. Wants new rpog for work.

## 2022-10-14 NOTE — PATIENT DISCUSSION
50% better per pt--Cont Xiidra BID. Discussed that Jeronimo Bergeron helps to increase the production of the patient's own tears and therefore can take 3-4 months for an improvement in symptoms. Use AT frequent.

## 2022-12-01 ENCOUNTER — APPOINTMENT (RX ONLY)
Dept: URBAN - METROPOLITAN AREA CLINIC 126 | Facility: CLINIC | Age: 57
Setting detail: DERMATOLOGY
End: 2022-12-01

## 2022-12-01 DIAGNOSIS — Z41.9 ENCOUNTER FOR PROCEDURE FOR PURPOSES OTHER THAN REMEDYING HEALTH STATE, UNSPECIFIED: ICD-10-CM

## 2022-12-01 PROCEDURE — ? BOTOX

## 2022-12-01 NOTE — PROCEDURE: BOTOX
Inferior Lateral Orbicularis Oculi Units: 0
Show Additional Area 2: Yes
Show Lcl Units: No
Show Inventory Tab: Show
Additional Area 1 Location: upper lip
Additional Area 1 Units: 4
Forehead Units: 12
Consent: Written consent obtained. Risks include but not limited to lid/brow ptosis, bruising, swelling, diplopia, temporary effect, incomplete chemical denervation.
Detail Level: Detailed
Glabellar Complex Units: 25
Post-Care Instructions: Patient instructed to not lie down for 4 hours and limit physical activity for 24 hours.

## 2023-02-03 ENCOUNTER — APPOINTMENT (RX ONLY)
Dept: URBAN - METROPOLITAN AREA CLINIC 125 | Facility: CLINIC | Age: 58
Setting detail: DERMATOLOGY
End: 2023-02-03

## 2023-02-03 DIAGNOSIS — Z41.9 ENCOUNTER FOR PROCEDURE FOR PURPOSES OTHER THAN REMEDYING HEALTH STATE, UNSPECIFIED: ICD-10-CM

## 2023-02-03 PROCEDURE — ? COSMETIC CONSULTATION: SKIN CARE PRODUCTS AND SERVICES

## 2023-02-03 PROCEDURE — ? COSMETIC CONSULTATION: SCITON MOXI

## 2023-02-14 ENCOUNTER — APPOINTMENT (RX ONLY)
Dept: URBAN - METROPOLITAN AREA CLINIC 126 | Facility: CLINIC | Age: 58
Setting detail: DERMATOLOGY
End: 2023-02-14

## 2023-02-14 DIAGNOSIS — Z41.9 ENCOUNTER FOR PROCEDURE FOR PURPOSES OTHER THAN REMEDYING HEALTH STATE, UNSPECIFIED: ICD-10-CM

## 2023-02-14 PROCEDURE — ? BOTOX

## 2023-02-14 PROCEDURE — ? FILLERS

## 2023-02-14 ASSESSMENT — LOCATION SIMPLE DESCRIPTION DERM: LOCATION SIMPLE: GLABELLA

## 2023-02-14 ASSESSMENT — LOCATION ZONE DERM: LOCATION ZONE: FACE

## 2023-02-14 ASSESSMENT — LOCATION DETAILED DESCRIPTION DERM: LOCATION DETAILED: GLABELLA

## 2023-02-14 NOTE — PROCEDURE: FILLERS
Dorsal Hands Filler  Volume In Cc: 0
Additional Anesthesia Volume In Cc: 6
Include Cannula Information In Note?: No
Number Of Syringes (Required For Inventory): 1
Inventory Information: This plan will send filler information to inventory based on the fillers you select. Multiple fillers can be sent but you must ensure you select the appropriate fillers in the inventory tab.
Detail Level: Detailed
Additional Area 1 Location: upper lip lines
Additional Area 2 Location: oral commissures
Additional Area 1 Volume In Cc: 0.2
Show Inventory Tab: Show
Additional Area 3 Location: lower lip lines
Filler: Juvederm Ultra XC
Consent: Written consent obtained. Risks include but not limited to bruising, beading, irregular texture, ulceration, infection, allergic reaction, scar formation, incomplete augmentation, temporary nature, procedural pain.
Additional Area 4 Location: laugh lines
Post-Care Instructions: Patient instructed to apply ice to reduce swelling.
Map Statment: See [address] for Complete Details
Vermilion Lips Filler Volume In Cc: 0.4
Anesthesia Type: 1% lidocaine with epinephrine
Anesthesia Volume In Cc: 0.5

## 2023-02-14 NOTE — PROCEDURE: BOTOX
Inferior Lateral Orbicularis Oculi Units: 0
Show Additional Area 2: Yes
Show Lcl Units: No
Show Inventory Tab: Show
Additional Area 1 Location: upper lip
Additional Area 1 Units: 4
Additional Area 2 Location: parentheses right
Additional Area 2 Units: 1
Forehead Units: 12
Consent: Written consent obtained. Risks include but not limited to lid/brow ptosis, bruising, swelling, diplopia, temporary effect, incomplete chemical denervation.
Detail Level: Detailed
Glabellar Complex Units: 25
Post-Care Instructions: Patient instructed to not lie down for 4 hours and limit physical activity for 24 hours.

## 2023-04-20 ENCOUNTER — APPOINTMENT (RX ONLY)
Dept: URBAN - METROPOLITAN AREA CLINIC 126 | Facility: CLINIC | Age: 58
Setting detail: DERMATOLOGY
End: 2023-04-20

## 2023-04-20 DIAGNOSIS — Z41.9 ENCOUNTER FOR PROCEDURE FOR PURPOSES OTHER THAN REMEDYING HEALTH STATE, UNSPECIFIED: ICD-10-CM

## 2023-04-20 PROCEDURE — ? DIAGNOSIS COMMENT

## 2023-04-20 PROCEDURE — ? SCITON BBL HERO

## 2023-04-20 PROCEDURE — ? SCITON MOXI

## 2023-04-20 ASSESSMENT — LOCATION DETAILED DESCRIPTION DERM: LOCATION DETAILED: LEFT INFERIOR CENTRAL MALAR CHEEK

## 2023-04-20 ASSESSMENT — LOCATION SIMPLE DESCRIPTION DERM: LOCATION SIMPLE: LEFT CHEEK

## 2023-04-20 ASSESSMENT — LOCATION ZONE DERM: LOCATION ZONE: FACE

## 2023-04-20 NOTE — PROCEDURE: SCITON BBL HERO
Pulse Width Units: milliseconds
Pulse Width - Will Not Render If 0: 0
Fluence (J/Cm2) - Will Not Render If 0: 5
Spot Size: Finesse Adapter Size: 15 x 15 mm square
Add Setting 2?: no
Pulse Width - Will Not Render If 0: 3
Temp (C): 25
Filter: 515nm Filter
Consent: Written consent obtained. Risks reviewed including but not limited to crusting, scabbing, blistering, scarring, darker or lighter pigmentary change, and incomplete clearance.
Detail Level: Simple
Procedure Note: After applying gel and applying protective eyeware, treatment was administered using the setting parameters listed above.
Total Pulses (Optional): [address]
Anesthesia Type: 1% lidocaine with epinephrine
Post-Care Instructions: I reviewed with the patient in detail post-care instructions. Patient should avoid sun exposure before and after treatment. Patient should wear sunscreen.

## 2023-04-20 NOTE — PROCEDURE: SCITON MOXI
Total Accumulated Energy (J): [facility] 9468
Pre=procedure Text: After consent was obtained, the treatment areas were cleaned and treated using the parameters noted above.
External Cooling Fan Speed: 5
Total Coverage (%): [id number] [facility]
Price (Use Numbers Only, No Special Characters Or $): 7963 [employer]
Detail Level: Detailed
% Per Pass: 3.3
Consent: Written consent obtained. Risks were reviewed including but not limited to crusting, scabbing, blistering, scarring, darker or lighter pigmentary change, incomplete improvement, prolonged erythema and facial swelling, infection, and bleeding.
Number Of Passes: 6
Energy (J/Cm2): 20
Treatment Number: 1
Anesthesia Type: 1% lidocaine with epinephrine
Level 3
Laser Type: Fractionated 1927nm, thulium laser
Post-Care Instructions: I reviewed with the patient in detail post-care instructions. Recommended diligent sun protection and sun avoidance.

## 2023-04-20 NOTE — PROCEDURE: DIAGNOSIS COMMENT
Comment: One pass with 515 for lentiginies
Render Risk Assessment In Note?: yes
Detail Level: Detailed

## 2023-09-28 ENCOUNTER — APPOINTMENT (RX ONLY)
Dept: URBAN - METROPOLITAN AREA CLINIC 126 | Facility: CLINIC | Age: 58
Setting detail: DERMATOLOGY
End: 2023-09-28

## 2023-09-28 DIAGNOSIS — Z41.9 ENCOUNTER FOR PROCEDURE FOR PURPOSES OTHER THAN REMEDYING HEALTH STATE, UNSPECIFIED: ICD-10-CM

## 2023-09-28 PROCEDURE — ? DIAGNOSIS COMMENT

## 2023-09-28 PROCEDURE — ? SCITON BBL HERO

## 2023-09-28 PROCEDURE — ? SCITON MOXI

## 2023-09-28 ASSESSMENT — LOCATION DETAILED DESCRIPTION DERM
LOCATION DETAILED: LEFT INFERIOR CENTRAL MALAR CHEEK
LOCATION DETAILED: RIGHT INFERIOR MEDIAL MALAR CHEEK

## 2023-09-28 ASSESSMENT — LOCATION ZONE DERM: LOCATION ZONE: FACE

## 2023-09-28 ASSESSMENT — LOCATION SIMPLE DESCRIPTION DERM
LOCATION SIMPLE: RIGHT CHEEK
LOCATION SIMPLE: LEFT CHEEK

## 2023-09-28 NOTE — PROCEDURE: SCITON BBL HERO
Pulse Width Units: milliseconds
Pulse Width - Will Not Render If 0: 0
Fluence (J/Cm2) - Will Not Render If 0: 15
Spot Size: Finesse Adapter Size: 15 x 15 mm square
Add Setting 2?: no
Pulse Width - Will Not Render If 0: [facility] [facility]
Temp (C): 20
Filter: 515nm Filter
Consent: Written consent obtained. Risks reviewed including but not limited to crusting, scabbing, blistering, scarring, darker or lighter pigmentary change, and incomplete clearance.
Detail Level: Simple
Rate (Hz): 1
Skin Type: III
Procedure Note: After applying gel and applying protective eyeware, treatment was administered using the setting parameters listed above.
Total Pulses (Optional): [facility] [facility]
Anesthesia Type: 1% lidocaine with epinephrine
Post-Care Instructions: I reviewed with the patient in detail post-care instructions. Patient should avoid sun exposure before and after treatment. Patient should wear sunscreen.

## 2023-09-28 NOTE — PROCEDURE: SCITON MOXI
Total Accumulated Energy (J): 1417 face and chest
Pre=procedure Text: After consent was obtained, the treatment areas were cleaned and treated using the parameters noted above.
External Cooling Fan Speed: 5
Total Coverage (%): [phone]
Price (Use Numbers Only, No Special Characters Or $): 1503 [employer]
Detail Level: Detailed
% Per Pass: 3.3
Consent: Written consent obtained. Risks were reviewed including but not limited to crusting, scabbing, blistering, scarring, darker or lighter pigmentary change, incomplete improvement, prolonged erythema and facial swelling, infection, and bleeding.
Number Of Passes: 6
Energy (J/Cm2): 20
Treatment Number: 1
Anesthesia Type: 1% lidocaine with epinephrine
Level 3
Laser Type: Fractionated 1927nm, thulium laser
Post-Care Instructions: I reviewed with the patient in detail post-care instructions. Recommended diligent sun protection and sun avoidance.

## 2023-09-28 NOTE — PROCEDURE: DIAGNOSIS COMMENT
Comment: Moxi(face) treatment #2 no charge bbl pass. \\nMoxi (chest) treatment #1 no charge bbl pass.
Render Risk Assessment In Note?: no
Detail Level: Simple

## 2023-10-16 ENCOUNTER — COMPREHENSIVE EXAM (OUTPATIENT)
Dept: URBAN - METROPOLITAN AREA CLINIC 34 | Facility: CLINIC | Age: 58
End: 2023-10-16

## 2023-10-16 DIAGNOSIS — H02.831: ICD-10-CM

## 2023-10-16 DIAGNOSIS — H02.834: ICD-10-CM

## 2023-10-16 DIAGNOSIS — H52.4: ICD-10-CM

## 2023-10-16 DIAGNOSIS — H04.123: ICD-10-CM

## 2023-10-16 PROCEDURE — 92014 COMPRE OPH EXAM EST PT 1/>: CPT

## 2023-10-16 PROCEDURE — 92015 DETERMINE REFRACTIVE STATE: CPT

## 2023-10-16 ASSESSMENT — TONOMETRY
OS_IOP_MMHG: 15
OD_IOP_MMHG: 15

## 2023-10-16 ASSESSMENT — VISUAL ACUITY
OD_CC: 20/20
OS_CC: 20/20-2

## 2023-12-22 ENCOUNTER — OFFICE VISIT (OUTPATIENT)
Dept: URBAN - METROPOLITAN AREA CLINIC 68 | Facility: CLINIC | Age: 58
End: 2023-12-22

## 2023-12-22 RX ORDER — OMEPRAZOLE 20 MG/1
CAPSULE, DELAYED RELEASE ORAL
Qty: 90 | Refills: 90 | Status: ACTIVE | COMMUNITY
Start: 2021-06-13

## 2023-12-22 RX ORDER — LOSARTAN POTASSIUM 25 MG/1
LOSARTAN POTASSIUM( 25MG ORAL 2 DAILY ) ACTIVE -HX ENTRY TABLET, FILM COATED ORAL DAILY
Status: ACTIVE | COMMUNITY
Start: 2021-08-02

## 2023-12-22 RX ORDER — ALPRAZOLAM 0.25 MG
XANAX( 0.25MG ORAL  AS NEEDED ) ACTIVE -HX ENTRY TABLET ORAL AS NEEDED
Status: ACTIVE | COMMUNITY
Start: 2021-08-02

## 2023-12-28 ENCOUNTER — TELEPHONE ENCOUNTER (OUTPATIENT)
Dept: URBAN - METROPOLITAN AREA CLINIC 68 | Facility: CLINIC | Age: 58
End: 2023-12-28

## 2023-12-29 ENCOUNTER — OFFICE VISIT (OUTPATIENT)
Dept: URBAN - METROPOLITAN AREA CLINIC 68 | Facility: CLINIC | Age: 58
End: 2023-12-29
Payer: COMMERCIAL

## 2023-12-29 ENCOUNTER — LAB OUTSIDE AN ENCOUNTER (OUTPATIENT)
Dept: URBAN - METROPOLITAN AREA CLINIC 68 | Facility: CLINIC | Age: 58
End: 2023-12-29

## 2023-12-29 VITALS
HEIGHT: 67 IN | DIASTOLIC BLOOD PRESSURE: 78 MMHG | WEIGHT: 155 LBS | BODY MASS INDEX: 24.33 KG/M2 | SYSTOLIC BLOOD PRESSURE: 126 MMHG

## 2023-12-29 DIAGNOSIS — R13.19 ESOPHAGEAL DYSPHAGIA: ICD-10-CM

## 2023-12-29 DIAGNOSIS — R10.13 DYSPEPSIA: ICD-10-CM

## 2023-12-29 PROCEDURE — 99204 OFFICE O/P NEW MOD 45 MIN: CPT | Performed by: INTERNAL MEDICINE

## 2023-12-29 RX ORDER — LOSARTAN POTASSIUM 25 MG/1
LOSARTAN POTASSIUM( 25MG ORAL 2 DAILY ) ACTIVE -HX ENTRY TABLET, FILM COATED ORAL DAILY
Status: ACTIVE | COMMUNITY
Start: 2021-08-02

## 2023-12-29 RX ORDER — OMEPRAZOLE 20 MG/1
CAPSULE, DELAYED RELEASE ORAL
Qty: 90 | Refills: 90 | Status: ACTIVE | COMMUNITY
Start: 2021-06-13

## 2023-12-29 RX ORDER — ALPRAZOLAM 0.25 MG
XANAX( 0.25MG ORAL  AS NEEDED ) ACTIVE -HX ENTRY TABLET ORAL AS NEEDED
Status: ACTIVE | COMMUNITY
Start: 2021-08-02

## 2023-12-29 NOTE — HPI-TODAY'S VISIT:
Case of a 58-year-old female patient that comes in today for evaluation.  Patient refers that the day after her son.  She was awakened with severe constant cramping upper abdominal pain.  Associated symptoms were nausea and vomiting.  This episode lasted about 24 hours and slowly improved.  Nevertheless since then she has been feeling a vague upper abdominal discomfort/soreness.  She denies nausea vomiting.  Denies melena hematochezia hematemesis coffee-ground emesis.  Of note she has a pertinent history of dysphagia secondary to esophageal stricture which was dilated in the past.  Upon interrogation she does acknowledges she is beginning to have difficulty swallowing pills.  This has not progressed to solids foods or liquids.  Nevertheless she refers that this is how her initiall  dysphagia symptoms presented and it slowly progressed.

## 2024-01-16 ENCOUNTER — OFFICE VISIT (OUTPATIENT)
Dept: URBAN - METROPOLITAN AREA CLINIC 67 | Facility: CLINIC | Age: 59
End: 2024-01-16
Payer: COMMERCIAL

## 2024-01-16 DIAGNOSIS — K76.0 FATTY (CHANGE OF) LIVER, NOT ELSEWHERE CLASSIFIED: ICD-10-CM

## 2024-01-16 DIAGNOSIS — K82.8 OTHER SPECIFIED DISEASES OF GALLBLADDER: ICD-10-CM

## 2024-01-16 PROCEDURE — 76705 ECHO EXAM OF ABDOMEN: CPT | Performed by: INTERNAL MEDICINE

## 2024-01-16 PROCEDURE — 93976 VASCULAR STUDY: CPT | Performed by: INTERNAL MEDICINE

## 2024-01-17 ENCOUNTER — OUT OF OFFICE VISIT (OUTPATIENT)
Dept: URBAN - METROPOLITAN AREA SURGERY CENTER 12 | Facility: SURGERY CENTER | Age: 59
End: 2024-01-17
Payer: COMMERCIAL

## 2024-01-17 ENCOUNTER — CLAIMS CREATED FROM THE CLAIM WINDOW (OUTPATIENT)
Dept: URBAN - METROPOLITAN AREA CLINIC 4 | Facility: CLINIC | Age: 59
End: 2024-01-17
Payer: COMMERCIAL

## 2024-01-17 DIAGNOSIS — K22.2 ESOPHAGEAL OBSTRUCTION: ICD-10-CM

## 2024-01-17 DIAGNOSIS — K29.70 GASTRITIS WITHOUT BLEEDING, UNSPECIFIED CHRONICITY, UNSPECIFIED GASTRITIS TYPE: ICD-10-CM

## 2024-01-17 DIAGNOSIS — K31.89 OTHER DISEASES OF STOMACH AND DUODENUM: ICD-10-CM

## 2024-01-17 DIAGNOSIS — K29.70 GASTRITIS: ICD-10-CM

## 2024-01-17 PROCEDURE — 43239 EGD BIOPSY SINGLE/MULTIPLE: CPT | Performed by: INTERNAL MEDICINE

## 2024-01-17 PROCEDURE — 43248 EGD GUIDE WIRE INSERTION: CPT | Performed by: INTERNAL MEDICINE

## 2024-01-17 PROCEDURE — 88305 TISSUE EXAM BY PATHOLOGIST: CPT | Performed by: PATHOLOGY

## 2024-01-17 PROCEDURE — 00731 ANES UPR GI NDSC PX NOS: CPT | Performed by: NURSE ANESTHETIST, CERTIFIED REGISTERED

## 2024-01-17 PROCEDURE — 88312 SPECIAL STAINS GROUP 1: CPT | Performed by: PATHOLOGY

## 2024-01-17 RX ORDER — OMEPRAZOLE 20 MG/1
CAPSULE, DELAYED RELEASE ORAL
Qty: 90 | Refills: 90 | Status: ACTIVE | COMMUNITY
Start: 2021-06-13

## 2024-01-17 RX ORDER — ALPRAZOLAM 0.25 MG
XANAX( 0.25MG ORAL  AS NEEDED ) ACTIVE -HX ENTRY TABLET ORAL AS NEEDED
Status: ACTIVE | COMMUNITY
Start: 2021-08-02

## 2024-01-17 RX ORDER — LOSARTAN POTASSIUM 25 MG/1
LOSARTAN POTASSIUM( 25MG ORAL 2 DAILY ) ACTIVE -HX ENTRY TABLET, FILM COATED ORAL DAILY
Status: ACTIVE | COMMUNITY
Start: 2021-08-02

## 2024-01-19 ENCOUNTER — OFFICE VISIT (OUTPATIENT)
Dept: URBAN - METROPOLITAN AREA CLINIC 68 | Facility: CLINIC | Age: 59
End: 2024-01-19
Payer: COMMERCIAL

## 2024-01-19 ENCOUNTER — LAB OUTSIDE AN ENCOUNTER (OUTPATIENT)
Dept: URBAN - METROPOLITAN AREA CLINIC 68 | Facility: CLINIC | Age: 59
End: 2024-01-19

## 2024-01-19 VITALS — HEIGHT: 67 IN | WEIGHT: 155 LBS | BODY MASS INDEX: 24.33 KG/M2

## 2024-01-19 DIAGNOSIS — K76.0 HEPATIC STEATOSIS: ICD-10-CM

## 2024-01-19 DIAGNOSIS — R93.2 ABNORMAL ULTRASOUND OF LIVER: ICD-10-CM

## 2024-01-19 DIAGNOSIS — R10.13 DYSPEPSIA: ICD-10-CM

## 2024-01-19 PROBLEM — 162031009: Status: ACTIVE | Noted: 2023-12-29

## 2024-01-19 PROBLEM — 197321007: Status: ACTIVE | Noted: 2024-01-19

## 2024-01-19 PROBLEM — 15633881000119102: Status: ACTIVE | Noted: 2024-01-19

## 2024-01-19 PROCEDURE — 99214 OFFICE O/P EST MOD 30 MIN: CPT | Performed by: INTERNAL MEDICINE

## 2024-01-19 RX ORDER — ALPRAZOLAM 0.25 MG
XANAX( 0.25MG ORAL  AS NEEDED ) ACTIVE -HX ENTRY TABLET ORAL AS NEEDED
Status: ACTIVE | COMMUNITY
Start: 2021-08-02

## 2024-01-19 RX ORDER — OMEPRAZOLE 20 MG/1
CAPSULE, DELAYED RELEASE ORAL
Qty: 90 | Refills: 90 | Status: ACTIVE | COMMUNITY
Start: 2021-06-13

## 2024-01-19 RX ORDER — PANTOPRAZOLE SODIUM 40 MG/1
1 TABLET TABLET, DELAYED RELEASE ORAL ONCE A DAY
Qty: 90 TABLET | OUTPATIENT
Start: 2024-01-19

## 2024-01-19 RX ORDER — LOSARTAN POTASSIUM 25 MG/1
LOSARTAN POTASSIUM( 25MG ORAL 2 DAILY ) ACTIVE -HX ENTRY TABLET, FILM COATED ORAL DAILY
Status: ACTIVE | COMMUNITY
Start: 2021-08-02

## 2024-01-19 NOTE — HPI-TODAY'S VISIT:
Case of a 58-year-old female patient that comes in today for follow up.  She underwent EGD recently due to conplaints of intermittent cramping upper abdominal pain.  Associated symptoms were nausea and vomiting.  She has also has H/O esophageal dysphaig. She underwent EGD results detailed below. She also underwent US. She comes in today for follow up. She continues with above symptoms. She denies nausea vomiting.  Denies melena hematochezia hematemesis coffee-ground emesis.

## 2024-01-23 PROBLEM — 40890009: Status: ACTIVE | Noted: 2023-12-29

## 2024-02-01 ENCOUNTER — OV EP (OUTPATIENT)
Dept: URBAN - METROPOLITAN AREA CLINIC 68 | Facility: CLINIC | Age: 59
End: 2024-02-01

## 2024-02-02 ENCOUNTER — LAB (OUTPATIENT)
Dept: URBAN - METROPOLITAN AREA CLINIC 68 | Facility: CLINIC | Age: 59
End: 2024-02-02

## 2024-02-02 PROBLEM — 15634181000119107: Status: ACTIVE | Noted: 2024-02-02

## 2024-02-06 ENCOUNTER — OV EP (OUTPATIENT)
Dept: URBAN - METROPOLITAN AREA CLINIC 68 | Facility: CLINIC | Age: 59
End: 2024-02-06

## 2024-02-27 ENCOUNTER — LAB (OUTPATIENT)
Dept: URBAN - METROPOLITAN AREA CLINIC 68 | Facility: CLINIC | Age: 59
End: 2024-02-27

## 2024-02-27 ENCOUNTER — OV EP (OUTPATIENT)
Dept: URBAN - METROPOLITAN AREA CLINIC 68 | Facility: CLINIC | Age: 59
End: 2024-02-27
Payer: COMMERCIAL

## 2024-02-27 VITALS
BODY MASS INDEX: 24.33 KG/M2 | DIASTOLIC BLOOD PRESSURE: 78 MMHG | SYSTOLIC BLOOD PRESSURE: 128 MMHG | WEIGHT: 155 LBS | HEIGHT: 67 IN

## 2024-02-27 DIAGNOSIS — K76.0 HEPATIC STEATOSIS: ICD-10-CM

## 2024-02-27 DIAGNOSIS — R93.2 ABNORMAL CT SCAN, LIVER: ICD-10-CM

## 2024-02-27 DIAGNOSIS — R10.13 DYSPEPSIA: ICD-10-CM

## 2024-02-27 PROBLEM — 118926004: Status: ACTIVE | Noted: 2024-02-27

## 2024-02-27 PROCEDURE — 99214 OFFICE O/P EST MOD 30 MIN: CPT | Performed by: INTERNAL MEDICINE

## 2024-02-27 RX ORDER — ALPRAZOLAM 0.25 MG
XANAX( 0.25MG ORAL  AS NEEDED ) ACTIVE -HX ENTRY TABLET ORAL AS NEEDED
Status: ACTIVE | COMMUNITY
Start: 2021-08-02

## 2024-02-27 RX ORDER — LOSARTAN POTASSIUM 25 MG/1
LOSARTAN POTASSIUM( 25MG ORAL 2 DAILY ) ACTIVE -HX ENTRY TABLET, FILM COATED ORAL DAILY
Status: ACTIVE | COMMUNITY
Start: 2021-08-02

## 2024-02-27 RX ORDER — PANTOPRAZOLE SODIUM 40 MG/1
1 TABLET TABLET, DELAYED RELEASE ORAL ONCE A DAY
Qty: 90 TABLET | Status: ACTIVE | COMMUNITY
Start: 2024-01-19

## 2024-02-27 NOTE — HPI-TODAY'S VISIT:
Case of a 58-year-old female patient that comes in today for follow up of dyspepsia and abnormal imaging of the liver.   She underwent EGD recently due to conplaints of intermittent cramping upper abdominal pain.  Associated symptoms were nausea and vomiting.  She has also has H/O esophageal dysphaia. She underwent EGD results detailed below. She also underwent US. For symptom control he was started on Pantoprazole. She comes in today for follow up. She is on PPi daily. Her symptoms have resolve. She denies nausea vomiting.  Denies melena hematochezia hematemesis coffee-ground emesis.

## 2024-02-29 LAB
A/G RATIO: 1.8
ABSOLUTE BAND NEUTROPHILS: (no result)
ABSOLUTE BASOPHILS: (no result)
ABSOLUTE BLASTS: (no result)
ABSOLUTE EOSINOPHILS: (no result)
ABSOLUTE LYMPHOCYTES: (no result)
ABSOLUTE METAMYELOCYTES: (no result)
ABSOLUTE MONOCYTES: (no result)
ABSOLUTE MYELOCYTES: (no result)
ABSOLUTE NEUTROPHILS: (no result)
ABSOLUTE NUCLEATED RBC: (no result)
ABSOLUTE PROMYELOCYTES: (no result)
ALBUMIN: 4.7
ALKALINE PHOSPHATASE: 52
ALT (SGPT): 16
AST (SGOT): 27
BAND NEUTROPHILS: (no result)
BASOPHILS: (no result)
BILIRUBIN, TOTAL: 1.5
BLASTS: (no result)
BUN/CREATININE RATIO: (no result)
BUN: 14
CA 19-9: (no result)
CALCIUM: 10.2
CARBON DIOXIDE, TOTAL: 30
CEA: (no result)
CHLORIDE: 98
COMMENT(S): (no result)
CREATININE: 0.69
EGFR: 101
EOSINOPHILS: (no result)
GLOBULIN, TOTAL: 2.6
GLUCOSE: 101
HEMATOCRIT: (no result)
HEMOGLOBIN: (no result)
LYMPHOCYTES: (no result)
MCH: (no result)
MCHC: (no result)
MCV: (no result)
METAMYELOCYTES: (no result)
MONOCYTES: (no result)
MPV: (no result)
MYELOCYTES: (no result)
NEUTROPHILS: (no result)
NUCLEATED RBC: (no result)
PLATELET COUNT: (no result)
POTASSIUM: 4.6
PROMYELOCYTES: (no result)
PROTEIN, TOTAL: 7.3
RDW: (no result)
REACTIVE LYMPHOCYTES: (no result)
RED BLOOD CELL COUNT: (no result)
SODIUM: 137
WHITE BLOOD CELL COUNT: (no result)

## 2024-03-11 PROBLEM — 16227531000119109: Status: ACTIVE | Noted: 2024-03-11

## 2024-04-02 ENCOUNTER — FIBROSCAN (OUTPATIENT)
Dept: URBAN - METROPOLITAN AREA CLINIC 67 | Facility: CLINIC | Age: 59
End: 2024-04-02

## 2024-04-03 ENCOUNTER — OV NP (OUTPATIENT)
Dept: URBAN - METROPOLITAN AREA CLINIC 9 | Facility: CLINIC | Age: 59
End: 2024-04-03
Payer: COMMERCIAL

## 2024-04-03 VITALS
WEIGHT: 155 LBS | DIASTOLIC BLOOD PRESSURE: 84 MMHG | HEIGHT: 67 IN | BODY MASS INDEX: 24.33 KG/M2 | SYSTOLIC BLOOD PRESSURE: 122 MMHG

## 2024-04-03 DIAGNOSIS — R93.3 IMAGING OF GASTROINTESTINAL TRACT ABNORMAL: ICD-10-CM

## 2024-04-03 PROCEDURE — 99204 OFFICE O/P NEW MOD 45 MIN: CPT | Performed by: INTERNAL MEDICINE

## 2024-04-03 RX ORDER — PANTOPRAZOLE SODIUM 40 MG/1
1 TABLET TABLET, DELAYED RELEASE ORAL ONCE A DAY
Qty: 90 TABLET | Status: ACTIVE | COMMUNITY
Start: 2024-01-19

## 2024-04-03 RX ORDER — LOSARTAN POTASSIUM 25 MG/1
LOSARTAN POTASSIUM( 25MG ORAL 2 DAILY ) ACTIVE -HX ENTRY TABLET, FILM COATED ORAL DAILY
Status: ACTIVE | COMMUNITY
Start: 2021-08-02

## 2024-04-03 RX ORDER — ALPRAZOLAM 0.25 MG
XANAX( 0.25MG ORAL  AS NEEDED ) ACTIVE -HX ENTRY TABLET ORAL AS NEEDED
Status: ACTIVE | COMMUNITY
Start: 2021-08-02

## 2024-04-03 NOTE — HPI-TODAY'S VISIT:
Pt here for evaluation of a liver abnormality and concern for cholangiocarcinoma. . 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  Referred here for EUS. I advised that if this is a hilar or intrahepatic tumor that FNB could be problematic for liver transplant candidacy and tumor seeding. As such I advised we can plan a FNB if there is lymphadenopathy or a distal biliary lesion, but this is an isolated hepatic/hilar mass I would only plan on EUS without FNB. Ultimately, she is likely going to need tertiary eval regardless of the outcome of the EUS and I advised she may want to coordinate that appt sooner rather than later. Otherwise f/u with Dr. Dc I reviewed MRI, CT and labs .

## 2024-04-17 ENCOUNTER — EUS (OUTPATIENT)
Dept: URBAN - METROPOLITAN AREA SURGERY CENTER 9 | Facility: SURGERY CENTER | Age: 59
End: 2024-04-17
Payer: COMMERCIAL

## 2024-04-17 ENCOUNTER — LAB (OUTPATIENT)
Dept: URBAN - METROPOLITAN AREA CLINIC 4 | Facility: CLINIC | Age: 59
End: 2024-04-17
Payer: COMMERCIAL

## 2024-04-17 DIAGNOSIS — K86.89 OTHER SPECIFIED DISEASES OF PANCREAS: ICD-10-CM

## 2024-04-17 DIAGNOSIS — K22.89 OTHER SPECIFIED DISEASE OF ESOPHAGUS: ICD-10-CM

## 2024-04-17 PROCEDURE — 43259 EGD US EXAM DUODENUM/JEJUNUM: CPT | Performed by: INTERNAL MEDICINE

## 2024-04-17 PROCEDURE — 88305 TISSUE EXAM BY PATHOLOGIST: CPT | Performed by: PATHOLOGY

## 2024-04-17 RX ORDER — LOSARTAN POTASSIUM 25 MG/1
LOSARTAN POTASSIUM( 25MG ORAL 2 DAILY ) ACTIVE -HX ENTRY TABLET, FILM COATED ORAL DAILY
Status: ACTIVE | COMMUNITY
Start: 2021-08-02

## 2024-04-17 RX ORDER — PANTOPRAZOLE SODIUM 40 MG/1
1 TABLET TABLET, DELAYED RELEASE ORAL ONCE A DAY
Qty: 90 | Refills: 0 | Status: ACTIVE | COMMUNITY
Start: 2024-01-19

## 2024-04-17 RX ORDER — ALPRAZOLAM 0.25 MG
XANAX( 0.25MG ORAL  AS NEEDED ) ACTIVE -HX ENTRY TABLET ORAL AS NEEDED
Status: ACTIVE | COMMUNITY
Start: 2021-08-02

## 2024-08-21 ENCOUNTER — WEB ENCOUNTER (OUTPATIENT)
Dept: URBAN - METROPOLITAN AREA CLINIC 68 | Facility: CLINIC | Age: 59
End: 2024-08-21

## 2024-08-21 RX ORDER — PANTOPRAZOLE SODIUM 40 MG/1
1 TABLET TABLET, DELAYED RELEASE ORAL ONCE A DAY
Qty: 90 | Refills: 0
Start: 2024-01-19

## 2024-09-10 NOTE — PROCEDURE: COOLSCULPTING
Applicator Size: standard applicator
Time (Minutes - Will Only Render If Nonzero): 0
Suction Settings: The suction settings were per protocol.
Time (Minutes - Will Only Render If Nonzero): [address] [facility] [place]
Time (Minutes - Will Only Render If Nonzero): [address] [address]
Detail Level: Zone
Time (Minutes - Will Only Render If Nonzero): 45
Consent: Written consent obtained, risks reviewed including, but not limited to, blistering from suction, darker or lighter pigmentary change, bruising, and/or need for multiple treatments.
Location 1: upper abdomen
Location 2: lower abdomen
Applicator Size: [address]
Intro: Prior to treatment, the area was cleaned with alcohol and marked out with a marking pen. The gel sheet was then applied uniformly. The applicator was applied to the skin with good contact and suction.
Applicator Size: large applicator
Time (Minutes - Will Only Render If Nonzero): 35
Applicator Size: CoolAdvantage Petite
Device: Coolsculpting
Applicator Size: CoolMini applicator
Post Treatment: After treatment, the suction was turned off, and the applicator was removed from the skin.
contact guard

## 2024-11-06 ENCOUNTER — COMPREHENSIVE EXAM (OUTPATIENT)
Dept: URBAN - METROPOLITAN AREA CLINIC 34 | Facility: CLINIC | Age: 59
End: 2024-11-06

## 2024-11-06 DIAGNOSIS — H52.4: ICD-10-CM

## 2024-11-06 DIAGNOSIS — H02.831: ICD-10-CM

## 2024-11-06 DIAGNOSIS — H04.123: ICD-10-CM

## 2024-11-06 DIAGNOSIS — H02.834: ICD-10-CM

## 2024-11-06 PROCEDURE — 92014 COMPRE OPH EXAM EST PT 1/>: CPT

## 2024-11-06 PROCEDURE — 92015 DETERMINE REFRACTIVE STATE: CPT

## 2025-05-14 ENCOUNTER — COMPREHENSIVE EXAM (OUTPATIENT)
Age: 60
End: 2025-05-14

## 2025-05-14 DIAGNOSIS — D49.9: ICD-10-CM

## 2025-05-14 DIAGNOSIS — H53.8: ICD-10-CM

## 2025-05-14 DIAGNOSIS — H02.834: ICD-10-CM

## 2025-05-14 DIAGNOSIS — H43.813: ICD-10-CM

## 2025-05-14 DIAGNOSIS — H04.123: ICD-10-CM

## 2025-05-14 DIAGNOSIS — H02.831: ICD-10-CM

## 2025-05-14 DIAGNOSIS — H52.4: ICD-10-CM

## 2025-05-14 DIAGNOSIS — Z79.899: ICD-10-CM

## 2025-05-14 PROCEDURE — 92134 CPTRZ OPH DX IMG PST SGM RTA: CPT

## 2025-05-14 PROCEDURE — 92015 DETERMINE REFRACTIVE STATE: CPT

## 2025-05-14 PROCEDURE — 92250 FUNDUS PHOTOGRAPHY W/I&R: CPT

## 2025-05-14 PROCEDURE — 99214 OFFICE O/P EST MOD 30 MIN: CPT

## 2025-07-18 ENCOUNTER — FOLLOW UP (OUTPATIENT)
Age: 60
End: 2025-07-18

## 2025-07-18 DIAGNOSIS — H52.4: ICD-10-CM

## 2025-07-18 DIAGNOSIS — H02.831: ICD-10-CM

## 2025-07-18 DIAGNOSIS — H02.834: ICD-10-CM

## 2025-07-18 DIAGNOSIS — D49.9: ICD-10-CM

## 2025-07-18 DIAGNOSIS — Z79.899: ICD-10-CM

## 2025-07-18 DIAGNOSIS — H43.813: ICD-10-CM

## 2025-07-18 DIAGNOSIS — H53.8: ICD-10-CM

## 2025-07-18 DIAGNOSIS — H04.123: ICD-10-CM

## 2025-07-18 PROCEDURE — 92134 CPTRZ OPH DX IMG PST SGM RTA: CPT

## 2025-07-18 PROCEDURE — 99214 OFFICE O/P EST MOD 30 MIN: CPT

## 2025-08-15 ENCOUNTER — TELEPHONE ENCOUNTER (OUTPATIENT)
Dept: URBAN - METROPOLITAN AREA SURGERY CENTER 12 | Facility: SURGERY CENTER | Age: 60
End: 2025-08-15

## 2025-08-19 ENCOUNTER — OFFICE VISIT (OUTPATIENT)
Dept: URBAN - METROPOLITAN AREA CLINIC 68 | Facility: CLINIC | Age: 60
End: 2025-08-19
Payer: COMMERCIAL

## 2025-08-19 ENCOUNTER — LAB OUTSIDE AN ENCOUNTER (OUTPATIENT)
Dept: URBAN - METROPOLITAN AREA CLINIC 68 | Facility: CLINIC | Age: 60
End: 2025-08-19

## 2025-08-19 DIAGNOSIS — C22.1 INTRAHEPATIC BILE DUCT CARCINOMA: ICD-10-CM

## 2025-08-19 DIAGNOSIS — Z12.11 COLON CANCER SCREENING: ICD-10-CM

## 2025-08-19 PROCEDURE — 99214 OFFICE O/P EST MOD 30 MIN: CPT | Performed by: INTERNAL MEDICINE

## 2025-08-19 RX ORDER — PANTOPRAZOLE SODIUM 40 MG/1
1 TABLET TABLET, DELAYED RELEASE ORAL ONCE A DAY
Qty: 90 | Refills: 0 | Status: ACTIVE | COMMUNITY
Start: 2024-01-19

## 2025-08-19 RX ORDER — SODIUM CHLORIDE TAB 1 GM 1 G
TAKE 1 TABLET TWICE A DAY BY MISCELL. ROUTE FOR 60 DAYS TAB MISCELLANEOUS
Qty: 120 EACH | Refills: 1 | Status: ACTIVE | COMMUNITY

## 2025-08-19 RX ORDER — LOSARTAN POTASSIUM 25 MG/1
LOSARTAN POTASSIUM( 25MG ORAL 2 DAILY ) ACTIVE -HX ENTRY TABLET, FILM COATED ORAL DAILY
Status: DISCONTINUED | COMMUNITY
Start: 2021-08-02

## 2025-08-19 RX ORDER — PEMIGATINIB 9 MG/1
TABLET ORAL
Qty: 14 TABLET | Refills: 2 | Status: ACTIVE | COMMUNITY

## 2025-08-19 RX ORDER — ROLAPITANT 90 MG/1
TABLET ORAL
Qty: 2 APPLICATOR | Refills: 0 | Status: ACTIVE | COMMUNITY

## 2025-08-19 RX ORDER — GABAPENTIN 300 MG/1
CAPSULE ORAL
Qty: 60 APPLICATOR | Refills: 0 | Status: ACTIVE | COMMUNITY

## 2025-08-19 RX ORDER — ESTRADIOL 0.1 MG/G
INSERT 1 GRAM VAGINALLY TWICE A WEEK AS DIRECTED CREAM VAGINAL
Qty: 42.5 GRAM | Refills: 0 | Status: ACTIVE | COMMUNITY

## 2025-08-19 RX ORDER — SOD SULF/POT CHLORIDE/MAG SULF 1.479 G
12 TABLETS TABLET ORAL
Qty: 24 | Refills: 0 | OUTPATIENT
Start: 2025-08-19 | End: 2025-08-20

## 2025-08-19 RX ORDER — ATORVASTATIN CALCIUM 10 MG/1
LIPITOR( 10MG ORAL   ) ACTIVE -HX ENTRY TABLET, FILM COATED ORAL
Status: DISCONTINUED | COMMUNITY
Start: 2021-08-02

## 2025-08-19 RX ORDER — ALPRAZOLAM 0.25 MG
XANAX( 0.25MG ORAL  AS NEEDED ) ACTIVE -HX ENTRY TABLET ORAL AS NEEDED
Status: ACTIVE | COMMUNITY
Start: 2021-08-02

## 2025-08-26 ENCOUNTER — DASHBOARD ENCOUNTERS (OUTPATIENT)
Age: 60
End: 2025-08-26

## 2025-08-26 ENCOUNTER — CLAIMS CREATED FROM THE CLAIM WINDOW (OUTPATIENT)
Dept: URBAN - METROPOLITAN AREA SURGERY CENTER 12 | Facility: SURGERY CENTER | Age: 60
End: 2025-08-26
Payer: COMMERCIAL

## 2025-08-26 ENCOUNTER — CLAIMS CREATED FROM THE CLAIM WINDOW (OUTPATIENT)
Dept: URBAN - METROPOLITAN AREA SURGERY CENTER 12 | Facility: SURGERY CENTER | Age: 60
End: 2025-08-26

## 2025-08-26 DIAGNOSIS — Z12.11 ENCOUNTER FOR SCREENING FOR MALIGNANT NEOPLASM OF COLON: ICD-10-CM

## 2025-08-26 DIAGNOSIS — K64.8 OTHER HEMORRHOIDS: ICD-10-CM

## 2025-08-26 PROCEDURE — G0121 COLON CA SCRN NOT HI RSK IND: HCPCS | Performed by: INTERNAL MEDICINE

## 2025-08-26 RX ORDER — ROLAPITANT 90 MG/1
TABLET ORAL
Qty: 2 APPLICATOR | Refills: 0 | Status: ACTIVE | COMMUNITY

## 2025-08-26 RX ORDER — GABAPENTIN 300 MG/1
CAPSULE ORAL
Qty: 60 APPLICATOR | Refills: 0 | Status: ACTIVE | COMMUNITY

## 2025-08-26 RX ORDER — SODIUM CHLORIDE TAB 1 GM 1 G
TAKE 1 TABLET TWICE A DAY BY MISCELL. ROUTE FOR 60 DAYS TAB MISCELLANEOUS
Qty: 120 EACH | Refills: 1 | Status: ACTIVE | COMMUNITY

## 2025-08-26 RX ORDER — ESTRADIOL 0.1 MG/G
INSERT 1 GRAM VAGINALLY TWICE A WEEK AS DIRECTED CREAM VAGINAL
Qty: 42.5 GRAM | Refills: 0 | Status: ACTIVE | COMMUNITY

## 2025-08-26 RX ORDER — PANTOPRAZOLE SODIUM 40 MG/1
1 TABLET TABLET, DELAYED RELEASE ORAL ONCE A DAY
Qty: 90 | Refills: 0 | Status: ACTIVE | COMMUNITY
Start: 2024-01-19

## 2025-08-26 RX ORDER — ALPRAZOLAM 0.25 MG
XANAX( 0.25MG ORAL  AS NEEDED ) ACTIVE -HX ENTRY TABLET ORAL AS NEEDED
Status: ACTIVE | COMMUNITY
Start: 2021-08-02

## 2025-08-26 RX ORDER — PEMIGATINIB 9 MG/1
TABLET ORAL
Qty: 14 TABLET | Refills: 2 | Status: ACTIVE | COMMUNITY